# Patient Record
(demographics unavailable — no encounter records)

---

## 2025-04-15 NOTE — REASON FOR VISIT
[Home] : at home, [unfilled] , at the time of the visit. [Medical Office: ([facility])___] : at the medical office located in  [Telehealth (audio & video)] : This visit was provided via telehealth using real-time 2-way audio visual technology. [Verbal consent obtained from patient] : the patient, [unfilled] [Consultation] : a consultation visit

## 2025-04-15 NOTE — REASON FOR VISIT
[Home] : at home, [unfilled] , at the time of the visit. [Medical Office: ([facility])___] : at the medical office located in  [Telehealth (audio & video)] : This visit was provided via telehealth using real-time 2-way audio visual technology. [Verbal consent obtained from patient] : the patient, [unfilled] [Consultation] : a consultation visit

## 2025-04-16 NOTE — ASSESSMENT
[FreeTextEntry1] : 67-year-old male with a single 15 mm submucosal nodule in the gastric fundus with benign biopsies who is recommended to have an endoscopic ultrasound for further evaluation to r/o GIST vs leiomyoma.   We discussed the proposed procedure, preparation and alternatives.  The risks (bleeding, perforation, infection) and benefits were discussed with the patient in details.  The patient understands the risks/benefits and agreed to proceed with procedure.   Plan 1. EUS with Dr Martin 4/21/2025 2. Instructions emailed.   I spent 15 minutes reviewing this patient's records and 22 minutes in face to face during this visit. All questions answered.  Patient to call me with any questions.

## 2025-04-16 NOTE — HISTORY OF PRESENT ILLNESS
[de-identified] : 4/3/2025 - Z-line irregular, 40 cm from the incisors.  Biopsied. - Nonerosive gastritis, characterized by erythema.  Biopsied. - A single submucosal papule nodule found in the stomach.  Biopsied. - Normal second portion of the duodenum and duodenal bulb.  Biopsied. [FreeTextEntry1] : 4/8/2025 -one 3 mm polyp in the descending colon, with a cold biopsy forcep.  Resected and retrieved. - One 2 mm polyp in the descending colon, removed with cold biopsy forcep.  Resected and retrieved - Internal nonbleeding hemorrhoids.

## 2025-04-16 NOTE — HISTORY OF PRESENT ILLNESS
[de-identified] : 4/3/2025 - Z-line irregular, 40 cm from the incisors.  Biopsied. - Nonerosive gastritis, characterized by erythema.  Biopsied. - A single submucosal papule nodule found in the stomach.  Biopsied. - Normal second portion of the duodenum and duodenal bulb.  Biopsied. [FreeTextEntry1] : 4/8/2025 -one 3 mm polyp in the descending colon, with a cold biopsy forcep.  Resected and retrieved. - One 2 mm polyp in the descending colon, removed with cold biopsy forcep.  Resected and retrieved - Internal nonbleeding hemorrhoids.